# Patient Record
Sex: MALE | Race: BLACK OR AFRICAN AMERICAN | Employment: UNEMPLOYED | URBAN - METROPOLITAN AREA
[De-identification: names, ages, dates, MRNs, and addresses within clinical notes are randomized per-mention and may not be internally consistent; named-entity substitution may affect disease eponyms.]

---
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
## 2025-05-19 ENCOUNTER — HOSPITAL ENCOUNTER (INPATIENT)
Facility: HOSPITAL | Age: 25
LOS: 2 days | Discharge: HOME | DRG: 751 | End: 2025-05-21
Attending: EMERGENCY MEDICINE | Admitting: PSYCHIATRY & NEUROLOGY
Payer: COMMERCIAL

## 2025-05-19 DIAGNOSIS — R45.851 SUICIDAL THOUGHTS: Primary | ICD-10-CM

## 2025-05-19 PROBLEM — F23 BRIEF PSYCHOTIC DISORDER (HCC): Status: ACTIVE | Noted: 2025-05-19

## 2025-05-19 LAB
ALBUMIN SERPL-MCNC: 3.9 G/DL (ref 3.5–5)
ALBUMIN/GLOB SERPL: 1.1 (ref 1.1–2.2)
ALP SERPL-CCNC: 52 U/L (ref 45–117)
ALT SERPL-CCNC: 19 U/L (ref 12–78)
ANION GAP SERPL CALC-SCNC: 7 MMOL/L (ref 2–12)
APAP SERPL-MCNC: <2 UG/ML (ref 10–30)
AST SERPL-CCNC: ABNORMAL U/L (ref 15–37)
BASOPHILS # BLD: 0.06 K/UL (ref 0–0.1)
BASOPHILS NFR BLD: 1.1 % (ref 0–1)
BILIRUB SERPL-MCNC: 3.3 MG/DL (ref 0.2–1)
BUN SERPL-MCNC: 10 MG/DL (ref 6–20)
BUN/CREAT SERPL: 8 (ref 12–20)
CALCIUM SERPL-MCNC: 9.3 MG/DL (ref 8.5–10.1)
CHLORIDE SERPL-SCNC: 102 MMOL/L (ref 97–108)
CO2 SERPL-SCNC: 26 MMOL/L (ref 21–32)
COMMENT:: NORMAL
CREAT SERPL-MCNC: 1.21 MG/DL (ref 0.7–1.3)
DIFFERENTIAL METHOD BLD: ABNORMAL
EOSINOPHIL # BLD: 0.08 K/UL (ref 0–0.4)
EOSINOPHIL NFR BLD: 1.5 % (ref 0–7)
ERYTHROCYTE [DISTWIDTH] IN BLOOD BY AUTOMATED COUNT: 11.1 % (ref 11.5–14.5)
ETHANOL SERPL-MCNC: <10 MG/DL (ref 0–0.08)
GLOBULIN SER CALC-MCNC: 3.6 G/DL (ref 2–4)
GLUCOSE SERPL-MCNC: 90 MG/DL (ref 65–100)
HCT VFR BLD AUTO: 41.6 % (ref 36.6–50.3)
HGB BLD-MCNC: 14.5 G/DL (ref 12.1–17)
IMM GRANULOCYTES # BLD AUTO: 0.01 K/UL (ref 0–0.04)
IMM GRANULOCYTES NFR BLD AUTO: 0.2 % (ref 0–0.5)
LYMPHOCYTES # BLD: 2.52 K/UL (ref 0.8–3.5)
LYMPHOCYTES NFR BLD: 47.5 % (ref 12–49)
MCH RBC QN AUTO: 34 PG (ref 26–34)
MCHC RBC AUTO-ENTMCNC: 34.9 G/DL (ref 30–36.5)
MCV RBC AUTO: 97.4 FL (ref 80–99)
MONOCYTES # BLD: 0.45 K/UL (ref 0–1)
MONOCYTES NFR BLD: 8.5 % (ref 5–13)
NEUTS SEG # BLD: 2.19 K/UL (ref 1.8–8)
NEUTS SEG NFR BLD: 41.2 % (ref 32–75)
NRBC # BLD: 0 K/UL (ref 0–0.01)
NRBC BLD-RTO: 0 PER 100 WBC
PLATELET # BLD AUTO: 208 K/UL (ref 150–400)
PMV BLD AUTO: 10 FL (ref 8.9–12.9)
POTASSIUM SERPL-SCNC: ABNORMAL MMOL/L (ref 3.5–5.1)
PROT SERPL-MCNC: 7.5 G/DL (ref 6.4–8.2)
RBC # BLD AUTO: 4.27 M/UL (ref 4.1–5.7)
SALICYLATES SERPL-MCNC: <1.7 MG/DL (ref 2.8–20)
SODIUM SERPL-SCNC: 135 MMOL/L (ref 136–145)
SPECIMEN HOLD: NORMAL
WBC # BLD AUTO: 5.3 K/UL (ref 4.1–11.1)

## 2025-05-19 PROCEDURE — 82077 ASSAY SPEC XCP UR&BREATH IA: CPT

## 2025-05-19 PROCEDURE — 1100000000 HC RM PRIVATE

## 2025-05-19 PROCEDURE — 80179 DRUG ASSAY SALICYLATE: CPT

## 2025-05-19 PROCEDURE — 99285 EMERGENCY DEPT VISIT HI MDM: CPT

## 2025-05-19 PROCEDURE — 80143 DRUG ASSAY ACETAMINOPHEN: CPT

## 2025-05-19 PROCEDURE — 80053 COMPREHEN METABOLIC PANEL: CPT

## 2025-05-19 PROCEDURE — 85025 COMPLETE CBC W/AUTO DIFF WBC: CPT

## 2025-05-19 ASSESSMENT — PAIN - FUNCTIONAL ASSESSMENT: PAIN_FUNCTIONAL_ASSESSMENT: NONE - DENIES PAIN

## 2025-05-19 NOTE — ED TRIAGE NOTES
Patient arrives to ED via [employer] under [employer] for suicidal ideations.  [employer] reports vague text messages sent to various family.  Family reported patient has not been sleeping or eating.

## 2025-05-19 NOTE — ED PROVIDER NOTES
05/19/2025   Component Date Value Ref Range Status    WBC 05/19/2025 5.3  4.1 - 11.1 K/uL Final    RBC 05/19/2025 4.27  4.10 - 5.70 M/uL Final    Hemoglobin 05/19/2025 14.5  12.1 - 17.0 g/dL Final    Hematocrit 05/19/2025 41.6  36.6 - 50.3 % Final    MCV 05/19/2025 97.4  80.0 - 99.0 FL Final    MCH 05/19/2025 34.0  26.0 - 34.0 PG Final    MCHC 05/19/2025 34.9  30.0 - 36.5 g/dL Final    RDW 05/19/2025 11.1 (L)  11.5 - 14.5 % Final    Platelets 05/19/2025 208  150 - 400 K/uL Final    MPV 05/19/2025 10.0  8.9 - 12.9 FL Final    Nucleated RBCs 05/19/2025 0.0  0  WBC Final    nRBC 05/19/2025 0.00  0.00 - 0.01 K/uL Final    Neutrophils % 05/19/2025 41.2  32.0 - 75.0 % Final    Lymphocytes % 05/19/2025 47.5  12.0 - 49.0 % Final    Monocytes % 05/19/2025 8.5  5.0 - 13.0 % Final    Eosinophils % 05/19/2025 1.5  0.0 - 7.0 % Final    Basophils % 05/19/2025 1.1 (H)  0.0 - 1.0 % Final    Immature Granulocytes % 05/19/2025 0.2  0.0 - 0.5 % Final    Neutrophils Absolute 05/19/2025 2.19  1.80 - 8.00 K/UL Final    Lymphocytes Absolute 05/19/2025 2.52  0.80 - 3.50 K/UL Final    Monocytes Absolute 05/19/2025 0.45  0.00 - 1.00 K/UL Final    Eosinophils Absolute 05/19/2025 0.08  0.00 - 0.40 K/UL Final    Basophils Absolute 05/19/2025 0.06  0.00 - 0.10 K/UL Final    Immature Granulocytes Absolute 05/19/2025 0.01  0.00 - 0.04 K/UL Final    Differential Type 05/19/2025 AUTOMATED    Final    Sodium 05/19/2025 135 (L)  136 - 145 mmol/L Final    Potassium 05/19/2025 HEMOLYZED,RECOLLECT REQUESTED  3.5 - 5.1 mmol/L Final    Chloride 05/19/2025 102  97 - 108 mmol/L Final    CO2 05/19/2025 26  21 - 32 mmol/L Final    Anion Gap 05/19/2025 7  2 - 12 mmol/L Final    PLEASE NOTE NEW REFERENCE RANGE    Glucose 05/19/2025 90  65 - 100 mg/dL Final    BUN 05/19/2025 10  6 - 20 MG/DL Final    Creatinine 05/19/2025 1.21  0.70 - 1.30 MG/DL Final    BUN/Creatinine Ratio 05/19/2025 8 (L)  12 - 20   Final    Est, Glom Filt Rate 05/19/2025 87  >60

## 2025-05-19 NOTE — ED NOTES
Patient changed into green gown with officer and safety sitter present. Personal belongings placed in bag and on shelf 3.

## 2025-05-20 LAB
AMPHET UR QL SCN: NEGATIVE
APPEARANCE UR: CLEAR
BACTERIA URNS QL MICRO: NEGATIVE /HPF
BARBITURATES UR QL SCN: NEGATIVE
BENZODIAZ UR QL: NEGATIVE
BILIRUB UR QL: NEGATIVE
CANNABINOIDS UR QL SCN: POSITIVE
COCAINE UR QL SCN: NEGATIVE
COLOR UR: ABNORMAL
EPITH CASTS URNS QL MICRO: ABNORMAL /LPF
GLUCOSE UR STRIP.AUTO-MCNC: NEGATIVE MG/DL
HGB UR QL STRIP: NEGATIVE
HYALINE CASTS URNS QL MICRO: ABNORMAL /LPF (ref 0–5)
KETONES UR QL STRIP.AUTO: 15 MG/DL
LEUKOCYTE ESTERASE UR QL STRIP.AUTO: NEGATIVE
Lab: ABNORMAL
METHADONE UR QL: NEGATIVE
NITRITE UR QL STRIP.AUTO: NEGATIVE
OPIATES UR QL: NEGATIVE
PCP UR QL: NEGATIVE
PH UR STRIP: 5.5 (ref 5–8)
PROT UR STRIP-MCNC: NEGATIVE MG/DL
RBC #/AREA URNS HPF: ABNORMAL /HPF (ref 0–5)
SP GR UR REFRACTOMETRY: 1.02 (ref 1–1.03)
SPECIMEN HOLD: NORMAL
UROBILINOGEN UR QL STRIP.AUTO: 1 EU/DL (ref 0.2–1)
WBC URNS QL MICRO: ABNORMAL /HPF (ref 0–4)

## 2025-05-20 PROCEDURE — 80307 DRUG TEST PRSMV CHEM ANLYZR: CPT

## 2025-05-20 PROCEDURE — 1240000000 HC EMOTIONAL WELLNESS R&B

## 2025-05-20 PROCEDURE — 81001 URINALYSIS AUTO W/SCOPE: CPT

## 2025-05-20 RX ORDER — MAGNESIUM HYDROXIDE/ALUMINUM HYDROXICE/SIMETHICONE 120; 1200; 1200 MG/30ML; MG/30ML; MG/30ML
30 SUSPENSION ORAL EVERY 6 HOURS PRN
Status: DISCONTINUED | OUTPATIENT
Start: 2025-05-20 | End: 2025-05-21 | Stop reason: HOSPADM

## 2025-05-20 RX ORDER — HALOPERIDOL 5 MG/1
5 TABLET ORAL EVERY 4 HOURS PRN
Status: DISCONTINUED | OUTPATIENT
Start: 2025-05-20 | End: 2025-05-21 | Stop reason: HOSPADM

## 2025-05-20 RX ORDER — SENNOSIDES A AND B 8.6 MG/1
1 TABLET, FILM COATED ORAL DAILY PRN
Status: DISCONTINUED | OUTPATIENT
Start: 2025-05-20 | End: 2025-05-21 | Stop reason: HOSPADM

## 2025-05-20 RX ORDER — TRAZODONE HYDROCHLORIDE 50 MG/1
50 TABLET ORAL NIGHTLY PRN
Status: DISCONTINUED | OUTPATIENT
Start: 2025-05-20 | End: 2025-05-21 | Stop reason: HOSPADM

## 2025-05-20 RX ORDER — DIPHENHYDRAMINE HYDROCHLORIDE 50 MG/ML
50 INJECTION, SOLUTION INTRAMUSCULAR; INTRAVENOUS EVERY 4 HOURS PRN
Status: DISCONTINUED | OUTPATIENT
Start: 2025-05-20 | End: 2025-05-21 | Stop reason: HOSPADM

## 2025-05-20 RX ORDER — HALOPERIDOL 5 MG/ML
5 INJECTION INTRAMUSCULAR EVERY 4 HOURS PRN
Status: DISCONTINUED | OUTPATIENT
Start: 2025-05-20 | End: 2025-05-21 | Stop reason: HOSPADM

## 2025-05-20 RX ORDER — ACETAMINOPHEN 325 MG/1
650 TABLET ORAL EVERY 4 HOURS PRN
Status: DISCONTINUED | OUTPATIENT
Start: 2025-05-20 | End: 2025-05-21 | Stop reason: HOSPADM

## 2025-05-20 RX ORDER — HYDROXYZINE HYDROCHLORIDE 50 MG/1
50 TABLET, FILM COATED ORAL 3 TIMES DAILY PRN
Status: DISCONTINUED | OUTPATIENT
Start: 2025-05-20 | End: 2025-05-21 | Stop reason: HOSPADM

## 2025-05-20 RX ORDER — POLYETHYLENE GLYCOL 3350 17 G/17G
17 POWDER, FOR SOLUTION ORAL DAILY PRN
Status: DISCONTINUED | OUTPATIENT
Start: 2025-05-20 | End: 2025-05-21 | Stop reason: HOSPADM

## 2025-05-20 ASSESSMENT — PAIN - FUNCTIONAL ASSESSMENT
PAIN_FUNCTIONAL_ASSESSMENT: ACTIVITIES ARE NOT PREVENTED
PAIN_FUNCTIONAL_ASSESSMENT: NONE - DENIES PAIN

## 2025-05-20 ASSESSMENT — LIFESTYLE VARIABLES
HOW OFTEN DO YOU HAVE A DRINK CONTAINING ALCOHOL: NEVER
HOW MANY STANDARD DRINKS CONTAINING ALCOHOL DO YOU HAVE ON A TYPICAL DAY: PATIENT DOES NOT DRINK

## 2025-05-20 ASSESSMENT — PAIN DESCRIPTION - FREQUENCY: FREQUENCY: INTERMITTENT

## 2025-05-20 ASSESSMENT — SLEEP AND FATIGUE QUESTIONNAIRES
DO YOU USE A SLEEP AID: NO
DO YOU HAVE DIFFICULTY SLEEPING: NO
AVERAGE NUMBER OF SLEEP HOURS: 8

## 2025-05-20 ASSESSMENT — PAIN SCALES - GENERAL: PAINLEVEL_OUTOF10: 0

## 2025-05-20 NOTE — ED NOTES
TRANSFER - OUT REPORT:    Verbal report given to BRI Downey on Vashti Domingo  being transferred to  for routine progression of patient care       Report consisted of patient's Situation, Background, Assessment and   Recommendations(SBAR).     Information from the following report(s) Nurse Handoff Report was reviewed with the receiving nurse.    [place] Fall Assessment:    Presents to emergency department  because of falls (Syncope, seizure, or loss of consciousness): No  Age > 70: No  Altered Mental Status, Intoxication with alcohol or substance confusion (Disorientation, impaired judgment, poor safety awaremess, or inability to follow instructions): No  Impaired Mobility: Ambulates or transfers with assistive devices or assistance; Unable to ambulate or transer.: No  Nursing Judgement: No          Lines:       Opportunity for questions and clarification was provided.      Patient transported with:  [employer]  Security

## 2025-05-20 NOTE — INTERDISCIPLINARY ROUNDS
Behavioral Health Interdisciplinary Rounds     Patient Name: Vashti Domingo  Age: 24 y.o.  Room/Bed:  726/  Primary Diagnosis: Brief psychotic disorder (HCC)  Admission Status: TDO  Readmission within 30 days: No  Power of  in place: No  Patient requires a blocked bed: No          Reason for blocked bed: N/A  Sleep hours: 1+  Participation in Care/Groups: New admit  Medication Compliant?:  N/A  PRNS (last 24 hours): None  Restraints (last 24 hours): No  _____________________________________________  24 hour chart check complete: Yes    _______________________________________________    Patient goal(s) for today: meet with treatment team   Treatment team focus/goals: Plan to set up for discharge   Progress note: He was committed at his hearing this am, denies SI, denies HI , denies any issues with with taking care of self.     Financial concerns/prescription coverage: [employer]   Family contact: mother - [phone]  Family requesting physician contact today:   Discharge plan: discharge home with his mother   Access to weapons: no  Outpatient provider(s): refer to [facility]     LOS:  1  Expected LOS: Today     Participating treatment team members: Amalia Sanderson SW- Dr. Zanoun - Kara C., RN

## 2025-05-20 NOTE — PROGRESS NOTES
Admission Medication Reconciliation:    Information obtained from:  patient interview, Insurance claims data, review of EMR, and [facility]  RxQuery data available¹:  YES    Comments/Recommendations: Updated PTA meds/reviewed patient's allergies.    1)  The patient denies taking medications PTA. He also denies previous psychiatric medication history. He endorses use of CBD gummies since Wednesday. He reports he had three bags - \"a talkative one, a relaxed one, and one to help with boost effects.\"    2)  The [place] Prescription Monitoring Program () was assessed to determine fill history of any controlled medications. The patient has NOT filled any controlled medications in the last  2 years.    3)  Medication changes (since last review): none   ¹RxQuery pharmacy benefit data reflects medications filled and processed through the patient's insurance, however this data does NOT capture whether the medication was picked up or is currently being taken by the patient.    Allergies:  Patient has no known allergies.    Significant PMH/Disease States: No past medical history on file.    Chief Complaint for this Admission:    Chief Complaint   Patient presents with    Suicidal     Prior to Admission Medications:   None     Rosie Morales RPH

## 2025-05-20 NOTE — H&P
PSYCHIATRY EVALUATION NOTE    CHIEF COMPLAINT: \"I just said I was going to cut myself.\"    HISTORY OF PRESENTING COMPLAINT:  Vashti Domingo is a 24 y.o. Black /  male who is currently admitted to the acute side of 7th floor behavioral health Unit at [facility].     Vashti says that he sent a message to his sibling and causing them to react. Vashti opens up about what he felt and said. He talks about an incident where he felt dizzy and 'heard voices in my head.' He says that he was freaking out the one day. The next day he says that this happened again, and he figured that he is being poisoned with some hidden substance. Patient's mother reached out to crisis and patient is on TDO, because he made statements about revenge and killing.    Vashti reports his mood has been fine for the past 2 weeks, reflecting on having celebrated mother's day and felt well. He denies having any suicidal or homicidal ideations, intents or plans. He denies access to fire-arms. He denies experiencing hallucinations at this time. He is suspicious that he may be poisoned by some substance for which he wants the team to test him.    PAST PSYCHIATRIC HISTORY   No past inpatient psychiatric admissions, last being  No suicide attempts, last being    No therapist or psychiatrist.    SUBSTANCE USE HISTORY:  Tobacco: vapes x 2 weeks.  Cannabis: got it on past wed/thu and says that he used it this one time.  Alcohol: only on mother's day.  IVD: none  No Cocaine/Heroin/amphetamines/LSD/PCP/Ketamine    PAST MEDICAL HISTORY:    Please see H&P for details.     No past medical history on file.  Prior to Admission medications    Not on File     Lab Results   Component Value Date    WBC 5.3 05/19/2025    HGB 14.5 05/19/2025    HCT 41.6 05/19/2025    MCV 97.4 05/19/2025     05/19/2025     Lab Results   Component Value Date     (L) 05/19/2025    K HEMOLYZED,RECOLLECT REQUESTED 05/19/2025     05/19/2025    CO2 26

## 2025-05-20 NOTE — ED NOTES
Per Richelle w/ Crisis- pt has been accepted and assigned to 726-2.  Accepted by Dr Velasquez and MAEVE Jerome.   Bedboard Sup, Emelina, informed of this info.

## 2025-05-20 NOTE — PLAN OF CARE
Problem: Behavior  Goal: Pt/Family maintain appropriate behavior and adhere to behavioral management agreement, if implemented  Description: INTERVENTIONS:1. Assess patient/family's coping skills and  non-compliant behavior (including use of illegal substances)2. Notify security of behavior or suspected illegal substances which indicate the need for search of the family and/or belongings3. Encourage verbalization of thoughts and concerns in a socially appropriate manner4. Utilize positive, consistent limit setting strategies supporting safety of patient, staff and others5. Encourage participation in the decision making process about the behavioral management agreement6. If a visitor's behavior poses a threat to safety call refer to organization policy.7. Initiate consult with , Psychosocial CNS, Spiritual Care as appropriate  5/20/2025 1700 by Sheila Landry LPN  Outcome: Progressing  5/20/2025 0909 by Marielena Dia, RN  Outcome: Progressing

## 2025-05-20 NOTE — PROGRESS NOTES
Behavioral Services  Medicare Certification Upon Admission    I certify that this patient's inpatient psychiatric hospital admission is medically necessary for:    [x] (1) Treatment which could reasonably be expected to improve this patient's condition,       [] (2) Or for diagnostic study;     AND     [x](2) The inpatient psychiatric services are provided while the individual is under the care of a physician and are included in the individualized plan of care.    Estimated length of stay/service 3-5 days.    Plan for post-hospital care Outpatient psychiatry.    Electronically signed by Larry Rivera MD on 5/20/2025 at 1:18 PM

## 2025-05-20 NOTE — ED NOTES
ED SIGN OUT NOTE  Care assumed at [facility] 9:04 PM EDT    Patient was signed out to me by Dr. Lala.     Patient is awaiting placement.    /84   Pulse 57   Temp 99.1 °F (37.3 °C) (Oral)   Resp 18   Ht 1.651 m (5' 5\")   Wt 53.5 kg (118 lb)   SpO2 99%   BMI 19.64 kg/m²     Labs Reviewed   CBC WITH AUTO DIFFERENTIAL - Abnormal; Notable for the following components:       Result Value    RDW 11.1 (*)     Basophils % 1.1 (*)     All other components within normal limits   COMPREHENSIVE METABOLIC PANEL - Abnormal; Notable for the following components:    Sodium 135 (*)     BUN/Creatinine Ratio 8 (*)     Total Bilirubin 3.3 (*)     All other components within normal limits   SALICYLATE LEVEL - Abnormal; Notable for the following components:    Salicylate Lvl <1.7 (*)     All other components within normal limits   ACETAMINOPHEN LEVEL - Abnormal; Notable for the following components:    Acetaminophen Level <2 (*)     All other components within normal limits   URINE CULTURE HOLD SAMPLE   ETHANOL   EXTRA TUBES HOLD   URINE DRUG SCREEN   URINALYSIS WITH MICROSCOPIC   EXTRA TUBES HOLD     No orders to display            Diagnosis:   1. Suicidal thoughts        Disposition:   Admitted 05/19/2025 10:29:53 PM    Plan:   Patient resting comfortably, comes in for suicidal ideation.  High risk.  Medically clear, TDO in place.  Patient admitted to the U to the care of Dr. Rivera.    DO Aveyr Workman Junaid, DO  05/19/25 0944

## 2025-05-20 NOTE — ED NOTES
Pt resting on stretcher- has no voiced concerns or complaints- sitter at bedside.  Officer remains w/ pt- pt is an ECO.  Left wrist forensic restraint in place. Pt able to move hand and wiggles fingers- +PMS intact.

## 2025-05-21 VITALS
TEMPERATURE: 99.5 F | WEIGHT: 118 LBS | SYSTOLIC BLOOD PRESSURE: 131 MMHG | OXYGEN SATURATION: 99 % | HEART RATE: 60 BPM | DIASTOLIC BLOOD PRESSURE: 89 MMHG | BODY MASS INDEX: 19.66 KG/M2 | HEIGHT: 65 IN | RESPIRATION RATE: 20 BRPM

## 2025-05-21 ASSESSMENT — PAIN SCALES - GENERAL: PAINLEVEL_OUTOF10: 0

## 2025-05-21 NOTE — DISCHARGE SUMMARY
DISCHARGE SUMMARY    Some parts of the discharge summary are from the initial Psychiatric interview that was done on admission by the admitting psychiatrist.      Date of Admission: 5/19/2025    Date of Discharge:5/21/2025     TYPE OF DISCHARGE:   REGULAR -  YES    ADMISSION EVALUATION:  CHIEF COMPLAINT: \"I just said I was going to cut myself.\"     HISTORY OF PRESENTING COMPLAINT:  Vashti Domingo is a 24 y.o. Black /  male who is currently admitted to the acute side of 7th floor behavioral health Unit at [facility].      Vashti says that he sent a message to his sibling and causing them to react. Vashti opens up about what he felt and said. He talks about an incident where he felt dizzy and 'heard voices in my head.' He says that he was freaking out the one day. The next day he says that this happened again, and he figured that he is being poisoned with some hidden substance. Patient's mother reached out to crisis and patient is on TDO, because he made statements about revenge and killing.     Vashti reports his mood has been fine for the past 2 weeks, reflecting on having celebrated mother's day and felt well. He denies having any suicidal or homicidal ideations, intents or plans. He denies access to fire-arms. He denies experiencing hallucinations at this time. He is suspicious that he may be poisoned by some substance for which he wants the team to test him.     PAST PSYCHIATRIC HISTORY   No past inpatient psychiatric admissions, last being  No suicide attempts, last being     No therapist or psychiatrist.     SUBSTANCE USE HISTORY:  Tobacco: vapes x 2 weeks.  Cannabis: got it on past wed/thu and says that he used it this one time.  Alcohol: only on mother's day.  IVD: none  No Cocaine/Heroin/amphetamines/LSD/PCP/Ketamine     PAST MEDICAL HISTORY:     Please see H&P for details.      Past Medical History   No past medical history on file.     Home Medications   Prior to Admission medications

## 2025-05-21 NOTE — PROGRESS NOTES
Pharmacist Discharge Medication Reconciliation    Discharging Provider: Dr. Rivera    Significant PMH: No past medical history on file.  Chief Complaint for this Admission:   Chief Complaint   Patient presents with    Suicidal     Allergies: Patient has no known allergies.    Discharge Medications:      Medication List      You have not been prescribed any medications.         The patient's chart, MAR and AVS were reviewed by Rosie Morales RPH.

## 2025-05-21 NOTE — PLAN OF CARE
Problem: Discharge Planning  Goal: Discharge to home or other facility with appropriate resources  Outcome: Progressing     Problem: Behavior  Goal: Pt/Family maintain appropriate behavior and adhere to behavioral management agreement, if implemented  Description: INTERVENTIONS:1. Assess patient/family's coping skills and  non-compliant behavior (including use of illegal substances)2. Notify security of behavior or suspected illegal substances which indicate the need for search of the family and/or belongings3. Encourage verbalization of thoughts and concerns in a socially appropriate manner4. Utilize positive, consistent limit setting strategies supporting safety of patient, staff and others5. Encourage participation in the decision making process about the behavioral management agreement6. If a visitor's behavior poses a threat to safety call refer to organization policy.7. Initiate consult with , Psychosocial CNS, Spiritual Care as appropriate  5/20/2025 1978 by Christophe Ortiz, RN  Outcome: Progressing   Patient resting in bed with eyes closed, appears to be sleeping, respirations even and unlabored.

## 2025-05-21 NOTE — CARE COORDINATION
05/21/25 1211   Suicidal Ideation   Wish to be Dead Lifetime - No;Past 1 month - No   Non-Specific Active Suicidal Thoughts Lifetime - No;Past 1 month - No       
home with  follow up

## 2025-05-21 NOTE — PLAN OF CARE
Problem: Discharge Planning  Goal: Discharge to home or other facility with appropriate resources  5/20/2025 2353 by Christophe Ortiz, RN  Outcome: Progressing     Problem: Behavior  Goal: Pt/Family maintain appropriate behavior and adhere to behavioral management agreement, if implemented  Description: INTERVENTIONS:1. Assess patient/family's coping skills and  non-compliant behavior (including use of illegal substances)2. Notify security of behavior or suspected illegal substances which indicate the need for search of the family and/or belongings3. Encourage verbalization of thoughts and concerns in a socially appropriate manner4. Utilize positive, consistent limit setting strategies supporting safety of patient, staff and others5. Encourage participation in the decision making process about the behavioral management agreement6. If a visitor's behavior poses a threat to safety call refer to organization policy.7. Initiate consult with , Psychosocial CNS, Spiritual Care as appropriate  5/21/2025 1011 by Anna Bhatt, RN  Outcome: Progressing  5/20/2025 2353 by Christophe Ortiz, RN  Outcome: Progressing    1140: Patient is participatory in treatment team. Mood and affect; calm, cooperative and pleasant, slightly anxious. Patient was involuntarily committed at hearing this morning. Social work reaching out to family to ensure safe place upon discharge. Patient still does not want to take medication. Denies SI/HI. Denies AH/VH. Plans are ongoing, patient voices no additional concerns at this time.

## 2025-05-21 NOTE — INTERDISCIPLINARY ROUNDS
Behavioral Health Interdisciplinary Rounds     Patient Name: Vashti Domingo  Age: 24 y.o.  Room/Bed:  733/  Primary Diagnosis: Brief psychotic disorder (HCC)   Admission Status:      Readmission within 30 days:   Power of  in place:   Patient requires a blocked bed: no          Reason for blocked bed:   Sleep hours:   Flu vaccine :no       Participation in Care/Groups:  yes  Medication Compliant?: yes  PRNS (last 24 hours): none    Restraints (last 24 hours):  no  _____________________________________________  24 hour chart check complete: yes     _____________________________________________    Patient goal(s) for today: meet with treatment team   Treatment team focus/goals: Plan to set up for discharge   Progress note: He was committed at his hearing this am, denies SI , denies HI , denies any issues with caring for self, does not think he needs to start a medication, denies being depressed or anxious , he denies AH or VH, slept 7 hours last night and has been appropriate on the unit     SW spoke with his mom , she agreed that he is at his baseline and she feels comfortable with discharge today      Spiritual Care Consult:   Financial concerns/prescription coverage:  [employer] [facility]   Family contact:   mother - [phone]                     Family requesting physician contact today:    Discharge plan: he would return home with his mother   Access to weapons : no                                                             Outpatient provider(s): refer to [facility]     LOS:  2  Expected LOS: today     Participating treatment team members: Amalia Sanderson,SHILPI- Dr. Nicole KIM RN

## 2025-05-21 NOTE — BH NOTE
4 Eyes Skin Assessment     NAME:  Vashti Domingo  YOB: 2000  MEDICAL RECORD NUMBER:  [id number]    The patient is being assessed for  Admission    I agree that at least one RN has performed a thorough Head to Toe Skin Assessment on the patient. ALL assessment sites listed below have been assessed.      Areas assessed by both nurses:    Head, Face, Ears, Shoulders, Back, Chest, Arms, Elbows, Hands, Sacrum. Buttock, Coccyx, Ischium, and Legs. Feet and Heels        Does the Patient have a Wound? No noted wound(s)       Brian Prevention initiated by RN: No  Wound Care Orders initiated by RN: No    Pressure Injury (Stage 3,4, Unstageable, DTI, NWPT, and Complex wounds) if present, place Wound referral order by RN under : No    New Ostomies, if present place, Ostomy referral order under : No     Nurse 1 eSignature: Electronically signed by MATTI FORBES RN on 5/20/25 at 4:48 AM EDT    **SHARE this note so that the co-signing nurse can place an eSignature**    Nurse 2 eSignature: Electronically signed by ADITYA MOORE RN on 5/20/25 at 4:48 AM EDT   
Behavioral Health [place]  Admission Note     Admission Type:   Admission Type: Involuntary    Reason for admission: \"It was a misunderstanding.\"       Medical Problems:   No past medical history on file.    Status EXAM:  Mental Status and Behavioral Exam  Normal: No  Level of Assistance: Independent/Self  Facial Expression: Avoids Gaze, Flat  Affect: Blunt  Level of Consciousness: Alert  Frequency of Checks: 4 times per hour, close  Mood:Normal: No  Mood: Anxious  Motor Activity:Normal: Yes  Eye Contact: Fair  Observed Behavior: Guarded  Sexual Misconduct History: Current - no  Preception: [place] to person, [place] to time, [place] to place, [place] to situation  Attention:Normal: Yes  Thought Processes: Unremarkable  Thought Content:Normal: Yes  Depression Symptoms: Change in energy level  Anxiety Symptoms: Generalized  Court Symptoms: No problems reported or observed.  Hallucinations: None  Delusions: No  Memory:Normal: Yes  Insight and Judgment: No    Pt admitted with followings belongings:  Dental Appliances: None  Vision - Corrective Lenses: None  Hearing Aid: None  Jewelry: Earrings (Pt is wearin earrings)  Body Piercings Removed: No  Clothing: Socks, Undergarments, Footwear, Shirt, Pants (Blue jeans, red [employer] t-shirt, boxer-briefs are with the Pt. Black hoodie, gray ankle socks, and Nike sldes are locked in Pt closet.)  Other Valuables: At home     Valuables placed in clear security bag number:  [id number]. Patient oriented to surroundings and program expectations and copy of patient rights given. Patient verbalized understanding. Patient educated on precautions.                   MATTI FORBES RN                     
Behavioral Health Transition Record    Patient Name: Vashti Domingo  YOB: 2000   Medical Record Number: [id number]  Date of Admission: 5/19/2025  5:22 PM   Date of Discharge: 5/21/25    Attending Provider: Larry Rivera MD   Discharging Provider: Dr. Rivera   To contact this individual call [phone]  and ask the  to page.  If unavailable, ask to be transferred to Behavioral Health Provider on call.  A Behavioral Health Provider will be available on call 24/7 and during holidays.    Primary Care Provider: Beena Cook MD    No Known Allergies    Reason for Admission:   CHIEF COMPLAINT: \"I just said I was going to cut myself.\"     HISTORY OF PRESENTING COMPLAINT:  Vashti Domingo is a 24 y.o. Black /  male who is currently admitted to the acute side of 7th floor behavioral health Unit at [facility].      Vashti says that he sent a message to his sibling and causing them to react. Vashti opens up about what he felt and said. He talks about an incident where he felt dizzy and 'heard voices in my head.' He says that he was freaking out the one day. The next day he says that this happened again, and he figured that he is being poisoned with some hidden substance. Patient's mother reached out to crisis and patient is on TDO, because he made statements about revenge and killing.     Vashti reports his mood has been fine for the past 2 weeks, reflecting on having celebrated mother's day and felt well. He denies having any suicidal or homicidal ideations, intents or plans. He denies access to fire-arms. He denies experiencing hallucinations at this time. He is suspicious that he may be poisoned by some substance for which he wants the team to test him.     Admission Diagnosis: Brief psychotic disorder (HCC) [F23]  Suicidal thoughts [R45.851]    * No surgery found *    Results for orders placed or performed during the hospital encounter of 05/19/25   Urine Culture Hold 
Mr. Domingo was involuntarily committed on 5/21 to [facility] for up to 30 days.  
PSYCHOSOCIAL ASSESSMENT  :Patient identifying info:   Vashti Domingo is a 24 y.o., male admitted 2025  5:22 PM     Presenting problem and precipitating factors:   he was admitted on a [facility] TDO due to making bizarre statements and texting his brother about killing self or others   / denies SI or HI here on the unit and denies AH ov VH   Mental status assessment: alert, oriented x's 3, denies SI , denies AH or VH     Strengths/Recreation/Coping Skills:support family - safe housing     Collateral information: mother - Ms. Domingo     Current psychiatric /substance abuse providers and contact info: no current providers     Previous psychiatric/substance abuse providers and response to treatment: no previous history     Family history of mental illness or substance abuse: he reports his mother takes medications for depression     Substance abuse history:    Social History     Tobacco Use    Smoking status: Never    Smokeless tobacco: Not on file   Substance Use Topics    Alcohol use: No       History of biomedical complications associated with substance abuse:     Patient's current acceptance of treatment or motivation for change: he was admitted on a [facility] TDO due to making bizarre statements and texting his brother about killing self or other     Family constellation: single, no children   Is significant other involved?     Describe support system:     Describe living arrangements and home environment: lives at home with his mother     GUARDIAN/POA:     Guardian Name:     Guardian Contact:     Health issues: No past medical history on file.     Trauma history: denies     Legal issues: no    History of  service: no    Financial status: student - unemployed     Islam/cultural factors: denies     Education/work history:taking college classes     Have you been licensed as a health care professional (current or ): no    Describe coping skills:ineffectual     HELIO DIAZ  2025  
TRANSFER - IN REPORT:    Verbal report received from [facility] ED on Vashti Domingo  being received from Delicia BRANCH RN for routine progression of patient care      Report consisted of patient's Situation, Background, Assessment and   Recommendations(SBAR).     Information from the following report(s) ED Encounter Summary and ED SBAR was reviewed with the receiving nurse.    Opportunity for questions and clarification was provided.      Assessment completed upon patient's arrival to unit and care assumed.    
and Plan:  Vashti Domingo meets criteria for a diagnosis of   Brief Psychotic Disorder    Continue the medication regimen as prescribed  Disposition planning to continue  Sw to safety plan with pt's mother for possible d/c today.    A coordinated, multidisplinary treatment team round was conducted with the patient, nurses, pharmcist,  and writer present. Discussions held with , and/or with family members; Complete current electronic health record for patient was reviewed in full including consultant notes, ancillary staff notes, nurses and tech notes, labs and vitals.    I certify that this patients inpatient psychiatric hospital services furnished since the previous certification were, and continue to be, required for treatment that could reasonably be expected to improve the patient's condition, or for diagnostic study, and that the patient continues to need, on a daily basis, active treatment furnished directly by or requiring the supervision of inpatient psychiatric facility personnel. In addition, the hospital records show that services furnished were intensive treatment services, admission or related services, or equivalent services.

## 2025-07-19 ENCOUNTER — HOSPITAL ENCOUNTER (EMERGENCY)
Facility: HOSPITAL | Age: 25
Discharge: HOME | End: 2025-07-19
Attending: STUDENT IN AN ORGANIZED HEALTH CARE EDUCATION/TRAINING PROGRAM
Payer: COMMERCIAL

## 2025-07-19 ENCOUNTER — APPOINTMENT (OUTPATIENT)
Facility: HOSPITAL | Age: 25
End: 2025-07-19
Payer: COMMERCIAL

## 2025-07-19 VITALS
DIASTOLIC BLOOD PRESSURE: 73 MMHG | SYSTOLIC BLOOD PRESSURE: 115 MMHG | OXYGEN SATURATION: 97 % | BODY MASS INDEX: 20.61 KG/M2 | TEMPERATURE: 97.5 F | WEIGHT: 123.68 LBS | HEART RATE: 99 BPM | HEIGHT: 65 IN | RESPIRATION RATE: 20 BRPM

## 2025-07-19 DIAGNOSIS — F32.A DEPRESSION, UNSPECIFIED DEPRESSION TYPE: Primary | ICD-10-CM

## 2025-07-19 DIAGNOSIS — F41.1 ANXIETY STATE: ICD-10-CM

## 2025-07-19 DIAGNOSIS — R74.8 ELEVATED LIVER ENZYMES: ICD-10-CM

## 2025-07-19 DIAGNOSIS — R17 ELEVATED BILIRUBIN: ICD-10-CM

## 2025-07-19 PROBLEM — F41.9 ANXIETY: Status: ACTIVE | Noted: 2025-07-19

## 2025-07-19 LAB
ALBUMIN SERPL-MCNC: 4.1 G/DL (ref 3.5–5)
ALBUMIN SERPL-MCNC: 4.1 G/DL (ref 3.5–5)
ALBUMIN/GLOB SERPL: 1.1 (ref 1.1–2.2)
ALBUMIN/GLOB SERPL: 1.1 (ref 1.1–2.2)
ALP SERPL-CCNC: 61 U/L (ref 45–117)
ALP SERPL-CCNC: 64 U/L (ref 45–117)
ALT SERPL-CCNC: 233 U/L (ref 12–78)
ALT SERPL-CCNC: 238 U/L (ref 12–78)
AMPHET UR QL SCN: NEGATIVE
ANION GAP SERPL CALC-SCNC: 5 MMOL/L (ref 2–12)
APAP SERPL-MCNC: <2 UG/ML (ref 10–30)
AST SERPL-CCNC: 114 U/L (ref 15–37)
AST SERPL-CCNC: 116 U/L (ref 15–37)
BARBITURATES UR QL SCN: NEGATIVE
BASOPHILS # BLD: 0.04 K/UL (ref 0–0.1)
BASOPHILS NFR BLD: 0.7 % (ref 0–1)
BENZODIAZ UR QL: NEGATIVE
BILIRUB DIRECT SERPL-MCNC: 0.3 MG/DL (ref 0–0.2)
BILIRUB SERPL-MCNC: 1.2 MG/DL (ref 0.2–1)
BILIRUB SERPL-MCNC: 1.3 MG/DL (ref 0.2–1)
BUN SERPL-MCNC: 7 MG/DL (ref 6–20)
BUN/CREAT SERPL: 6 (ref 12–20)
CALCIUM SERPL-MCNC: 9.1 MG/DL (ref 8.5–10.1)
CANNABINOIDS UR QL SCN: POSITIVE
CHLORIDE SERPL-SCNC: 107 MMOL/L (ref 97–108)
CO2 SERPL-SCNC: 26 MMOL/L (ref 21–32)
COCAINE UR QL SCN: NEGATIVE
CREAT SERPL-MCNC: 1.27 MG/DL (ref 0.7–1.3)
DIFFERENTIAL METHOD BLD: ABNORMAL
EOSINOPHIL # BLD: 0.07 K/UL (ref 0–0.4)
EOSINOPHIL NFR BLD: 1.3 % (ref 0–7)
ERYTHROCYTE [DISTWIDTH] IN BLOOD BY AUTOMATED COUNT: 12.4 % (ref 11.5–14.5)
ETHANOL SERPL-MCNC: <10 MG/DL (ref 0–0.08)
GLOBULIN SER CALC-MCNC: 3.7 G/DL (ref 2–4)
GLOBULIN SER CALC-MCNC: 3.8 G/DL (ref 2–4)
GLUCOSE SERPL-MCNC: 107 MG/DL (ref 65–100)
HCT VFR BLD AUTO: 40.3 % (ref 36.6–50.3)
HGB BLD-MCNC: 13.5 G/DL (ref 12.1–17)
IMM GRANULOCYTES # BLD AUTO: 0.01 K/UL (ref 0–0.04)
IMM GRANULOCYTES NFR BLD AUTO: 0.2 % (ref 0–0.5)
LYMPHOCYTES # BLD: 1.13 K/UL (ref 0.8–3.5)
LYMPHOCYTES NFR BLD: 20.1 % (ref 12–49)
Lab: ABNORMAL
MCH RBC QN AUTO: 34.9 PG (ref 26–34)
MCHC RBC AUTO-ENTMCNC: 33.5 G/DL (ref 30–36.5)
MCV RBC AUTO: 104.1 FL (ref 80–99)
METHADONE UR QL: NEGATIVE
MONOCYTES # BLD: 0.49 K/UL (ref 0–1)
MONOCYTES NFR BLD: 8.8 % (ref 5–13)
NEUTS SEG # BLD: 3.86 K/UL (ref 1.8–8)
NEUTS SEG NFR BLD: 68.9 % (ref 32–75)
NRBC # BLD: 0 K/UL (ref 0–0.01)
NRBC BLD-RTO: 0 PER 100 WBC
OPIATES UR QL: NEGATIVE
PCP UR QL: NEGATIVE
PLATELET # BLD AUTO: 218 K/UL (ref 150–400)
PMV BLD AUTO: 10.1 FL (ref 8.9–12.9)
POTASSIUM SERPL-SCNC: 4 MMOL/L (ref 3.5–5.1)
PROT SERPL-MCNC: 7.8 G/DL (ref 6.4–8.2)
PROT SERPL-MCNC: 7.9 G/DL (ref 6.4–8.2)
RBC # BLD AUTO: 3.87 M/UL (ref 4.1–5.7)
RBC MORPH BLD: ABNORMAL
SALICYLATES SERPL-MCNC: <1.7 MG/DL (ref 2.8–20)
SODIUM SERPL-SCNC: 138 MMOL/L (ref 136–145)
WBC # BLD AUTO: 5.6 K/UL (ref 4.1–11.1)

## 2025-07-19 PROCEDURE — 85025 COMPLETE CBC W/AUTO DIFF WBC: CPT

## 2025-07-19 PROCEDURE — 80307 DRUG TEST PRSMV CHEM ANLYZR: CPT

## 2025-07-19 PROCEDURE — 80074 ACUTE HEPATITIS PANEL: CPT

## 2025-07-19 PROCEDURE — 99284 EMERGENCY DEPT VISIT MOD MDM: CPT

## 2025-07-19 PROCEDURE — 80053 COMPREHEN METABOLIC PANEL: CPT

## 2025-07-19 PROCEDURE — 76705 ECHO EXAM OF ABDOMEN: CPT

## 2025-07-19 PROCEDURE — 80143 DRUG ASSAY ACETAMINOPHEN: CPT

## 2025-07-19 PROCEDURE — 80076 HEPATIC FUNCTION PANEL: CPT

## 2025-07-19 PROCEDURE — 82077 ASSAY SPEC XCP UR&BREATH IA: CPT

## 2025-07-19 PROCEDURE — 80179 DRUG ASSAY SALICYLATE: CPT

## 2025-07-19 RX ORDER — HYDROXYZINE HYDROCHLORIDE 50 MG/1
50 TABLET, FILM COATED ORAL 3 TIMES DAILY PRN
Qty: 30 TABLET | Refills: 0 | Status: SHIPPED | OUTPATIENT
Start: 2025-07-19 | End: 2025-07-29

## 2025-07-19 ASSESSMENT — PAIN - FUNCTIONAL ASSESSMENT: PAIN_FUNCTIONAL_ASSESSMENT: NONE - DENIES PAIN

## 2025-07-19 NOTE — ED TRIAGE NOTES
Pt comes to ED from home with his mom with reports of \"not feeling myself and weakness.\" Pt's mom states pt has been off his medication, having anxiety, and not sleeping.     Pt's mom reports pt was recently discharged from [facility] for schizophrenia and anxiety. Mom reports he has an appointment with intake on Monday and an psychologist appointment in 7/29.     Pt denies SI/HI.

## 2025-07-19 NOTE — [facility] NOTE
[facility] Update Note  [facility] aware of telepsych recommendation for discharge when medically cleared with medication recommendations. Should patient desire mental health resources list, please advise.

## 2025-07-19 NOTE — ED PROVIDER NOTES
[facility] EMERGENCY DEPARTMENT  EMERGENCY DEPARTMENT ENCOUNTER      Date: 7/19/2025  Patient Name: Vashti Domingo  MRN: [id number]  Birthdate 2000  Date of evaluation: 7/19/2025  Provider: MARTHA Brambila NP   Note Started: 4:30 PM EDT 7/19/25    CHIEF COMPLAINT     Chief Complaint   Patient presents with    Mental Health Problem       HISTORY OF PRESENT ILLNESS  (Onset, Location, Duration, Character, Alleviating/Aggravating, Radiation, Timing, Severity)   Note limiting factors.   History Provided By: Patient and mom    HPI: Vashti Domingo is a 24 y.o. male with a psychiatric/schizophrenia/anxiety history presents with \"not feeling like myself.\"  Accompanied by mom who provides most of the HPI.  Patient is nondescript in his answers.  He does state that he is not feeling SI/HI/self-harm.  Mom states he was recently discharged from a psychiatric facility in [place] 2 weeks ago.  Since that time he has been staying with her.  He was prescribed 14 days of Ativan upon departure which she states he has run out of and has not taken it in the last week.  No reported withdrawal symptoms.  Patient states he is feeling some generalized weakness without specific complaint.  Denies fevers, nausea, vomiting, diarrhea, abdominal pain, urinary symptoms.  Denies alcohol/drug use.  Appointment with psych on 7/29/2025.      Nursing Notes and triage vitals were reviewed.  PCP: Beena Cook MD      PAST MEDICAL HISTORY   Past Medical History:  No past medical history on file.    Past Surgical History:  No past surgical history on file.    Family History:  No family history on file.    Social History:  Social History     Tobacco Use    Smoking status: Never   Substance Use Topics    Alcohol use: No    Drug use: No       Allergies:  No Known Allergies    Current Meds:   No current facility-administered medications for this encounter.     Current Outpatient Medications   Medication Sig Dispense Refill

## 2025-07-19 NOTE — VIRTUAL HEALTH
Vashti Domingo  [id number]  2000     Social Work Behavioral Health Crisis Assessment    07/19/25    Chief Complaint: mental health problem    HPI: Patient is a 24 y.o. Black /  male who presents for mental health problem. Patient presented to the ED on 07/19/25 from home.    Per EMR review of ED encounter, patient presents for \"Accompanied by mom who provides most of the HPI. Patient is nondescript in his answers. He does state that he is not feeling SI/HI/self-harm. Mom states he was recently discharged from a psychiatric facility in [place] 2 weeks ago. Since that time he has been staying with her. He was prescribed 14 days of Ativan upon departure which she states he has run out of and has not taken it in the last week. No reported withdrawal symptoms. Patient states he is feeling some generalized weakness without specific complaint. Denies fevers, nausea, vomiting, diarrhea, abdominal pain, urinary symptoms. Denies alcohol/drug use. Appointment with psych on 7/29/2025. \"  Per EMR review, patient has previous diagnosis of schizophrenia, depression, and anxiety.   Received consult for unable to care for self. Reviewed EMR. Writer met with patient who was agreeable to assessment. Patient was A/O with flat affect and poor eye contact. He remained calm and cooperative throughout discussion. He denies any SI/HI or ATV/H.     Patient stated reason for ED encounter today is \"need to get a couple days and feel like myself again, get my mental straight, feel weak, haven't been taking anxiety medications.\" Patient reports not taking his antianxiety medication lorazepam for unknown period of time. He's had increased restlessness and decreased sleep. He denied any depression symptoms or suicidal ideation. His appetite has not changed.   Patient has intake appointment on Monday and psychiatry appointment on 7/29. He was admitted to inpatient psychiatric admission at [place] and discharged 2-3

## 2025-07-19 NOTE — DISCHARGE INSTRUCTIONS
Thank you for allowing us to provide you with medical care today.  We realize that you have many choices for your emergency care needs.  We thank you for choosing [facility].  Please choose us in the future for any continued health care needs.     We hope we addressed all of your medical concerns. We strive to provide excellent quality care in the Emergency Department.  Anything less than excellent does not meet our expectations.     The exam and treatment you received in the Emergency Department were for an emergent problem and are not intended as complete care. It is important that you follow up with a doctor, nurse practitioner, or physician’s assistant for ongoing care. If your symptoms worsen or you do not improve as expected and you are unable to reach your usual health care provider, you should return to the Emergency Department. We are available 24 hours a day.     Take this sheet with you when you go to your follow-up visit.     If you have any problem arranging the follow-up visit, contact the Emergency Department immediately.     Make an appointment your family doctor for follow up of this visit. Return to the ER if you are unable to be seen in a timely manner.     Below is a summary of your results.    Labs  Recent Results (from the past 12 hours)   CBC with Auto Differential    Collection Time: 07/19/25  4:47 PM   Result Value Ref Range    WBC 5.6 4.1 - 11.1 K/uL    RBC 3.87 (L) 4.10 - 5.70 M/uL    Hemoglobin 13.5 12.1 - 17.0 g/dL    Hematocrit 40.3 36.6 - 50.3 %    .1 (H) 80.0 - 99.0 FL    MCH 34.9 (H) 26.0 - 34.0 PG    MCHC 33.5 30.0 - 36.5 g/dL    RDW 12.4 11.5 - 14.5 %    Platelets 218 150 - 400 K/uL    MPV 10.1 8.9 - 12.9 FL    Nucleated RBCs 0.0 0  WBC    nRBC 0.00 0.00 - 0.01 K/uL    Neutrophils % 68.9 32.0 - 75.0 %    Lymphocytes % 20.1 12.0 - 49.0 %    Monocytes % 8.8 5.0 - 13.0 %    Eosinophils % 1.3 0.0 - 7.0 %    Basophils % 0.7 0.0 - 1.0 %    Immature

## 2025-07-19 NOTE — VIRTUAL HEALTH
Vashti Domingo, was evaluated through a synchronous (real-time) audio-video encounter. The patient (and/or guardian if applicable) is aware that this is a billable service, which includes applicable co-pays. This virtual visit was conducted with patient's (and/or legal guardian's) consent. Patient identification was verified, and a caregiver was present when appropriate.  The patient was located at Facility (Appt Department): [facility] EMERGENCY DEPARTMENT  [address] [place], [place] [id number]  Loc: [phone]  The provider was located at Home (City/State): [place]  Confirm you are appropriately licensed, registered, or certified to deliver care in the state where the patient is located as indicated above. If you are not or unsure, please re-schedule the visit: Yes, I confirm.   [place] Consult to Tele-pysch Provider  Consult performed by: Chivo Rashid APRN - CNP  Consult ordered by: Breanna Trimble MD        Vashti Domingo  [id number]  2000     EMERGENCY DEPARTMENT TELEPSYCHIATRY EVALUATION    07/19/25    Chief Complaint:  “Anxiety”    Per Record:   Accompanied by mom who provides most of the HPI. Patient is nondescript in his answers. He does state that he is not feeling SI/HI/self-harm. Mom states he was recently discharged from a psychiatric facility in [place] 2 weeks ago. Since that time he has been staying with her. He was prescribed 14 days of Ativan upon departure which she states he has run out of and has not taken it in the last week. No reported withdrawal symptoms. Patient states he is feeling some generalized weakness without specific complaint. Denies fevers, nausea, vomiting, diarrhea, abdominal pain, urinary symptoms. Denies alcohol/drug use.       Pt comes to ED from home with his mom with reports of \"not feeling myself and weakness.\" Pt's mom states pt has been off his medication, having anxiety, and not sleeping.      Pt's mom reports pt was recently

## 2025-07-20 LAB
-: NORMAL
HAV IGM SER QL: NONREACTIVE
HBV CORE IGM SER QL: NONREACTIVE
HBV SURFACE AG SER QL: <0.1 INDEX
HBV SURFACE AG SER QL: NEGATIVE
HCV AB SER IA-ACNC: <0.02 INDEX
HCV AB SERPL QL IA: NONREACTIVE